# Patient Record
Sex: MALE
[De-identification: names, ages, dates, MRNs, and addresses within clinical notes are randomized per-mention and may not be internally consistent; named-entity substitution may affect disease eponyms.]

---

## 2018-09-14 ENCOUNTER — HOSPITAL ENCOUNTER (EMERGENCY)
Dept: HOSPITAL 25 - UCEAST | Age: 18
Discharge: HOME | End: 2018-09-14
Payer: COMMERCIAL

## 2018-09-14 DIAGNOSIS — Z72.51: ICD-10-CM

## 2018-09-14 DIAGNOSIS — R30.0: Primary | ICD-10-CM

## 2018-09-14 DIAGNOSIS — Z86.19: ICD-10-CM

## 2018-09-14 PROCEDURE — 86703 HIV-1/HIV-2 1 RESULT ANTBDY: CPT

## 2018-09-14 PROCEDURE — 96372 THER/PROPH/DIAG INJ SC/IM: CPT

## 2018-09-14 PROCEDURE — 81003 URINALYSIS AUTO W/O SCOPE: CPT

## 2018-09-14 PROCEDURE — 99202 OFFICE O/P NEW SF 15 MIN: CPT

## 2018-09-14 PROCEDURE — G0463 HOSPITAL OUTPT CLINIC VISIT: HCPCS

## 2018-09-14 PROCEDURE — 87491 CHLMYD TRACH DNA AMP PROBE: CPT

## 2018-09-14 PROCEDURE — 87591 N.GONORRHOEAE DNA AMP PROB: CPT

## 2018-09-14 PROCEDURE — 36415 COLL VENOUS BLD VENIPUNCTURE: CPT

## 2018-09-14 NOTE — UC
Complaint Male HPI





- HPI Summary


HPI Summary: 








19 yo male presents with urinary symptoms. He tells me that he is experiencing 

itching and "discomfort" at the end of his urethra over the last 4 days. 

Discomfort is most noticeable during urination. Has also been feeling more 

tired recently. He had unprotected sex about 2 weeks ago with a new partner - 

he has contacted her and she says she has no symptoms. Pt has had chlamydia in 

the past and says this feels similar. Denies fever, chills, hematuria, penile 

discharge, or testicular/scrotum tenderness.








- History of Current Complaint


Chief Complaint: UCGU


Stated Complaint: PERSONAL


Time Seen by Provider: 09/14/18 14:27


Hx Obtained From: Patient


Severity Initially: Moderate


Severity Currently: Moderate


Pain Intensity: 5


Pain Scale Used: 0-10 Numeric





- Allergies/Home Medications


Allergies/Adverse Reactions: 


 Allergies











Allergy/AdvReac Type Severity Reaction Status Date / Time


 


No Known Allergies Allergy   Verified 09/14/18 14:09











Home Medications: 


 Home Medications





NK [No Home Medications Reported]  09/14/18 [History Confirmed 09/14/18]











PMH/Surg Hx/FS Hx/Imm Hx





- Additional Past Medical History


Additional PMH: 





None





- Surgical History


Surgical History: Yes


Surgery Procedure, Year, and Place: wisdom tooth removal, adenoidectomy





- Family History


Known Family History: Positive: None





- Social History


Occupation: Student


Lives: Dormitory/Roommates


Alcohol Use: Occasionally


Substance Use Type: None


Smoking Status (MU): Never Smoked Tobacco





Review of Systems


Constitutional: Negative


Skin: Negative


Respiratory: Negative


Cardiovascular: Negative


Gastrointestinal: Negative


Genitourinary: Vaginal/Penile Itching


Neurovascular: Negative


Neurological: Negative


Psychological: Negative


All Other Systems Reviewed And Are Negative: Yes





Physical Exam





- Summary


Physical Exam Summary: 





GENERAL: NAD. WDWN. No pain distress.


SKIN: No rashes, sores, lesions, or open wounds.


NECK: Supple. Nontender. No lymphadenopathy. 


CHEST:  CTAB. No r/r/w. No accessory muscle use. Breathing comfortably and in 

no distress.


CV:  RRR. Without m/r/g. Pulses intact. Cap refill <2seconds


ABDOMEN: Soft. NTTP. No distention or guarding. No CVA tenderness. Bowel sounds 

present


NEURO: Alert. 


PSYCH: Age appropriate behavior.


Triage Information Reviewed: Yes


Vital Signs: 


 Initial Vital Signs











Temp  98.3 F   09/14/18 14:04


 


Pulse  59   09/14/18 14:04


 


Resp  18   09/14/18 14:04


 


BP  123/87   09/14/18 14:04


 


Pulse Ox  100   09/14/18 14:04








 Laboratory Tests











  09/14/18





  14:34


 


POC Urine Color  Yellow


 


POC Urine Clarity  Clear


 


POC Urine pH  7.5


 


POC Ur Specif Gravity  1.015


 


POC Urine Protein  Negative


 


POC Ur Glucose (UA)  Negative


 


POC Urine Ketones  Negative


 


POC Urine Blood  Negative


 


POC Urine Nitrite  Negative


 


POC Urine Bilirubin  Negative


 


POC Urine Urobilinogen  0.2


 


POC U Leukocyte Esteras  Negative











Vital Signs Reviewed: Yes


Male Genital Exam: Positive: Normal Genitalia, No Hernia, Other - No LAD.  

Negative: Epididymal Tenderness, Inguinal Tenderness, Scrotum Tenderness (R), 

Scrotum Tenderness (L), Testicular Tenderness (R), Testicular Tenderness (L), 

Urethral Discharge





 Complaint Male Course/Dx





- Course


Course Of Treatment: Options were discussed with the pt that his symptoms could 

be related to a GC/C infection. We discussed treatment now with testing or 

holding treatment and testing first. Pt elected to have treatment today and 

send urine for testing. He is also asking for HIV testing, but declines further 

labwork/STD testing. He was treated for GC/C with 250mg ceftriaxone and 1gm of 

azithromycin in the clinical course. Urine was sent.





- Differential Dx/Diagnosis


Provider Diagnoses: Dysuria.  Unprotected intercourse





Discharge





- Sign-Out/Discharge


Documenting (check all that apply): Patient Departure


All imaging exams completed and their final reports reviewed: No Studies





- Discharge Plan


Condition: Stable


Disposition: HOME


Patient Education Materials:  Safe Sex (ED)


Referrals: 


No Primary Care Phys,NOPCP [Primary Care Provider] - 


Additional Instructions: 


If you develop a fever, shortness of breath, chest pain, new or worsening 

symptoms - please call your PCP or go to the ED.


 








- Billing Disposition and Condition


Condition: STABLE


Disposition: Home

## 2018-09-17 NOTE — UC
- Progress Note


Progress Note: 





neg gc, neg ch


neg HIV


no change


ljj 9/17/2018





Discharge





- Sign-Out/Discharge


Documenting (check all that apply): Post-Discharge Follow Up


All imaging exams completed and their final reports reviewed: No Studies





- Discharge Plan


Condition: Stable


Disposition: HOME


Patient Education Materials:  Safe Sex (ED)


Referrals: 


No Primary Care Phys,NOPCP [Primary Care Provider] - 


Additional Instructions: 


If you develop a fever, shortness of breath, chest pain, new or worsening 

symptoms - please call your PCP or go to the ED.


 








- Billing Disposition and Condition


Condition: STABLE


Disposition: Home

## 2018-12-07 ENCOUNTER — HOSPITAL ENCOUNTER (EMERGENCY)
Dept: HOSPITAL 25 - UCEAST | Age: 18
Discharge: HOME | End: 2018-12-07
Payer: COMMERCIAL

## 2018-12-07 DIAGNOSIS — L98.9: Primary | ICD-10-CM

## 2018-12-07 DIAGNOSIS — Z72.51: ICD-10-CM

## 2018-12-07 PROCEDURE — G0463 HOSPITAL OUTPT CLINIC VISIT: HCPCS

## 2018-12-07 PROCEDURE — 99211 OFF/OP EST MAY X REQ PHY/QHP: CPT

## 2018-12-07 NOTE — UC
Skin Complaint HPI





- HPI Summary


HPI Summary: 





19 yo male presents with rash on groin. He tells me that on 12/5 he had 

unprotected intercourse with a new female partner. On the morning f 12/6 he 

woke with red spots in his groin and on his penis that were mildly itchy. Also 

thinks he noticed an enlarged lymph node in his groin. Today the spots are 

mostly healed and no itching. He was able to contact his partner and partner 

denied any symptoms or hx of STD. Pt denies fever, dysuria, hematuria, or 

drainage/pain from the rash. 





- History of Current Complaint


Chief Complaint: UCSkin


Time Seen by Provider: 12/07/18 16:56


Stated Complaint: RASH


Hx Obtained From: Patient


Onset/Duration: Sudden Onset


Onset Severity: Mild


Current Severity: Mild


Pain Intensity: 2


Pain Scale Used: 0-10 Numeric





- Allergy/Home Medications


Allergies/Adverse Reactions: 


 Allergies











Allergy/AdvReac Type Severity Reaction Status Date / Time


 


No Known Allergies Allergy   Verified 12/07/18 16:52














PMH/Surg Hx/FS Hx/Imm Hx





- Additional Past Medical History


Additional PMH: 





None





- Surgical History


Surgical History: Yes


Surgery Procedure, Year, and Place: wisdom tooth removal, adenoidectomy





- Family History


Known Family History: Positive: None





- Social History


Occupation: Student


Lives: Dormitory/Roommates


Alcohol Use: Occasionally


Substance Use Type: None


Smoking Status (MU): Never Smoked Tobacco





Review of Systems


All Other Systems Reviewed And Are Negative: Yes


Constitutional: Positive: Negative


Skin: Positive: Rash


Respiratory: Positive: Negative


Cardiovascular: Positive: Negative


Gastrointestinal: Positive: Negative


Genitourinary: Positive: Negative


Neurovascular: Positive: Negative


Neurological: Positive: Negative


Psychological: Positive: Negative





Physical Exam





- Summary


Physical Exam Summary: 





GENERAL: NAD. WDWN. No pain distress.


SKIN: Genital: On penis there are three <5mm areas of linear erythema which 

appear like abrasion. On pubis there are scant erythematous pustules that are 

NTTP. No drainage, streaking, ulcerations, or ecchymosis.


NECK: Supple. Nontender. No lymphadenopathy. 


CHEST:  No accessory muscle use. Breathing comfortably and in no distress.


CV:  Pulses intact. Cap refill <2seconds


NEURO: Alert.


PSYCH: Age appropriate behavior.





Triage Information Reviewed: Yes


Vital Signs: 


 Initial Vital Signs











Temp  98.3 F   12/07/18 16:47


 


Pulse  63   12/07/18 16:47


 


Resp  16   12/07/18 16:47


 


BP  126/84   12/07/18 16:47


 


Pulse Ox  100   12/07/18 16:47











Vital Signs Reviewed: Yes


Male Genital Exam: Positive: Other - B/L 5mm lymph nodes in inguinal region, 

mobile and nttp. See skin above for description of rash.  Negative: Epididymal 

Tenderness, Inguinal Tenderness, Scrotum Tenderness (R), Scrotum Tenderness (L)

, Testicular Tenderness (R), Testicular Tenderness (L), Urethral Discharge





Course/Dx





- Course


Course Of Treatment: Suspect skin irritation from recent sexual intercourse. Pt 

says rash is better today compared to yesterday. Rash does not appear to be 

consistent with any infectious process. He declined STD testing today and 

elected to continue to monitor his symptoms and will f/u if his symptoms 

persist or worsen.





- Diagnoses


Provider Diagnosis: 


 Skin irritation, High risk sexual behavior








Discharge





- Sign-Out/Discharge


Documenting (check all that apply): Patient Departure


All imaging exams completed and their final reports reviewed: No Studies





- Discharge Plan


Condition: Stable


Disposition: HOME


Patient Education Materials:  Contact Dermatitis (DC)


Referrals: 


No Primary Care Phys,NOPCP [Primary Care Provider] - 


Additional Instructions: 


If you develop a fever, shortness of breath, chest pain, new or worsening 

symptoms - please call your PCP or go to the ED.


 


1) Monitor your symptoms and refrain from using any creams/ointments to the 

area. I suspect your symptoms will go away in a couple of days - if they do not

, please be rechecked





- Billing Disposition and Condition


Condition: STABLE


Disposition: Home

## 2018-12-13 ENCOUNTER — HOSPITAL ENCOUNTER (EMERGENCY)
Dept: HOSPITAL 25 - UCEAST | Age: 18
Discharge: HOME | End: 2018-12-13
Payer: COMMERCIAL

## 2018-12-13 DIAGNOSIS — J03.90: ICD-10-CM

## 2018-12-13 DIAGNOSIS — J32.9: Primary | ICD-10-CM

## 2018-12-13 PROCEDURE — 87651 STREP A DNA AMP PROBE: CPT

## 2018-12-13 PROCEDURE — 99212 OFFICE O/P EST SF 10 MIN: CPT

## 2018-12-13 PROCEDURE — G0463 HOSPITAL OUTPT CLINIC VISIT: HCPCS

## 2018-12-13 NOTE — UC
Throat Pain/Nasal Rodney HPI





- HPI Summary


HPI Summary: 





18-year-old male comes to clinic today with a chief complaint of one week of 

upper respiratory tract infection symptoms.  Started out with yellow and green 

sputum and rhinorrhea.  Last couple of days he started getting chills and 

sweats fevers.  His toes started hurting in the last couple of days.  Patient 

started with body aches also.  No shortness of breath.  Acetaminophen does help 

with the fevers and the body aches.





- History of Current Complaint


Chief Complaint: UCGeneralIllness


Stated Complaint: URI


Time Seen by Provider: 12/13/18 15:48


Pain Intensity: 6





- Allergies/Home Medications


Allergies/Adverse Reactions: 


 Allergies











Allergy/AdvReac Type Severity Reaction Status Date / Time


 


No Known Allergies Allergy   Verified 12/13/18 15:37














PMH/Surg Hx/FS Hx/Imm Hx


Previously Healthy: Yes





- Surgical History


Surgical History: Yes


Surgery Procedure, Year, and Place: wisdom tooth removal, adenoidectomy





- Family History


Known Family History: Positive: None





- Social History


Alcohol Use: Occasionally


Substance Use Type: None


Smoking Status (MU): Never Smoked Tobacco





Review of Systems


All Other Systems Reviewed And Are Negative: Yes


Constitutional: Positive: Fever, Chills


Skin: Positive: Negative


Eyes: Positive: Negative


ENT: Positive: Sore Throat, Nasal Discharge, Sinus Congestion


Respiratory: Positive: Negative


Cardiovascular: Positive: Negative


Gastrointestinal: Positive: Negative


Motor: Positive: Negative


Neurovascular: Positive: Negative


Musculoskeletal: Positive: Negative


Neurological: Positive: Negative


Psychological: Positive: Negative


Is Patient Immunocompromised?: No





Physical Exam


Triage Information Reviewed: Yes


Appearance: No Pain Distress, Well-Nourished, Ill-Appearing - MILD


Vital Signs: 


 Initial Vital Signs











Temp  98.7 F   12/13/18 15:31


 


Pulse  72   12/13/18 15:31


 


Resp  16   12/13/18 15:31


 


BP  140/84   12/13/18 15:31


 


Pulse Ox  99   12/13/18 15:31











Vital Signs Reviewed: Yes


Eye Exam: Normal


Eyes: Positive: Conjunctiva Clear


ENT: Positive: Pharyngeal erythema, Nasal congestion, Nasal drainage, TM dull


Neck exam: Normal


Neck: Positive: Supple


Respiratory: Positive: Lungs clear, Normal breath sounds, No respiratory 

distress


Cardiovascular: Positive: RRR


Musculoskeletal Exam: Normal


Musculoskeletal: Positive: Strength Intact, ROM Intact


Neurological Exam: Normal


Neurological: Positive: Alert, Muscle Tone Normal


Psychological Exam: Normal


Psychological: Positive: Age Appropriate Behavior


Skin Exam: Normal





Throat Pain/Nasal Course/Dx





- Course


Course Of Treatment: DISCUSSED VIRAL VERSES BACTERIAL INFECTION AND THE ROLE OF 

ANTIBIOTICS.  THE PATIENT WISHES TO BE ON ANTIBIOTIC AT THIS TIME.





- Differential Dx/Diagnosis


Provider Diagnosis: 


 Sinusitis, Tonsillitis








Discharge





- Sign-Out/Discharge


Documenting (check all that apply): Patient Departure


All imaging exams completed and their final reports reviewed: No Studies





- Discharge Plan


Condition: Stable


Disposition: HOME


Patient Education Materials:  Sinusitis (ED), Tonsillitis (ED)


Referrals: 


[facility] [Provider Group]


[facility] [place] [facility] [place]


Additional Instructions: 


FOLLOW UP WITH YOUR DOCTOR IF NOT COMPLETELY IMPROVED.


GET RECHECKED FOR ANY WORSENING OF YOUR CONDITION OR QUESTIONS OR CONCERNS.








- Billing Disposition and Condition


Condition: STABLE


Disposition: Home

## 2019-04-23 ENCOUNTER — HOSPITAL ENCOUNTER (EMERGENCY)
Dept: HOSPITAL 25 - UCEAST | Age: 19
Discharge: HOME | End: 2019-04-23
Payer: COMMERCIAL

## 2019-04-23 VITALS — SYSTOLIC BLOOD PRESSURE: 121 MMHG | DIASTOLIC BLOOD PRESSURE: 82 MMHG

## 2019-04-23 DIAGNOSIS — H92.01: ICD-10-CM

## 2019-04-23 DIAGNOSIS — R09.82: ICD-10-CM

## 2019-04-23 DIAGNOSIS — J02.9: Primary | ICD-10-CM

## 2019-04-23 PROCEDURE — 87651 STREP A DNA AMP PROBE: CPT

## 2019-04-23 PROCEDURE — G0463 HOSPITAL OUTPT CLINIC VISIT: HCPCS

## 2019-04-23 PROCEDURE — 99211 OFF/OP EST MAY X REQ PHY/QHP: CPT

## 2019-04-23 NOTE — UC
Throat Pain/Nasal Rodney HPI





- HPI Summary


HPI Summary: 





17 yo male with sore throat x 2 days


? fever


some post nasal drip





right otalgia








- History of Current Complaint


Chief Complaint: UCRespiratory


Stated Complaint: SORE THROAT


Time Seen by Provider: 04/23/19 21:44


Hx Obtained From: Patient


Onset/Duration: Gradual Onset


Severity: Moderate


Pain Intensity: 7


Pain Scale Used: 0-10 Numeric


Associated Signs & Symptoms: Positive: Nasal Discharge, Fever - ?


Related History: Prior ENT Surgery - adenoidectomy





- Epiglottits Risk Factors


Epiglottis Risk Factors: Negative





- Allergies/Home Medications


Allergies/Adverse Reactions: 


 Allergies











Allergy/AdvReac Type Severity Reaction Status Date / Time


 


No Known Allergies Allergy   Verified 04/23/19 21:50











Home Medications: 


 Home Medications





D-Methorphan/PE/Acetaminophen [Daytime Cold-Flu Relief Softgl] 1 cap PO Q8HR 

PRN 04/23/19 [History Confirmed 04/23/19]


Ibuprofen [Advil] 400 mg PO Q8HR PRN 04/23/19 [History Confirmed 04/23/19]











PMH/Surg Hx/FS Hx/Imm Hx


Previously Healthy: Yes





- Surgical History


Surgical History: Yes


Surgery Procedure, Year, and Place: wisdom tooth removal, addenoidectomy





- Family History


Known Family History: Positive: Hypertension





- Social History


Alcohol Use: Occasionally


Substance Use Type: None


Smoking Status (MU): Never Smoked Tobacco





Review of Systems


All Other Systems Reviewed And Are Negative: Yes


Constitutional: Positive: Fever - conner


Skin: Positive: Negative


Eyes: Positive: Negative


ENT: Positive: Sore Throat, Ear Ache


Respiratory: Positive: Negative


Cardiovascular: Positive: Negative


Gastrointestinal: Positive: Negative


Genitourinary: Positive: Negative


Motor: Positive: Negative


Neurovascular: Positive: Negative


Musculoskeletal: Positive: Negative


Neurological: Positive: Negative


Psychological: Positive: Negative





Physical Exam


Triage Information Reviewed: Yes


Appearance: Well-Appearing, No Pain Distress, Well-Nourished


Vital Signs: 


 Initial Vital Signs











Temp  98.6 F   04/23/19 21:46


 


Pulse  66   04/23/19 21:46


 


Resp  18   04/23/19 21:46


 


BP  121/82   04/23/19 21:46


 


Pulse Ox  99   04/23/19 21:46











Vital Signs Reviewed: Yes


Eyes: Positive: Conjunctiva Clear


ENT: Positive: Hearing grossly normal, Pharyngeal erythema, Nasal congestion, 

TMs normal, Uvula midline.  Negative: Nasal drainage, Tonsillar swelling, 

Tonsillar exudate, Trismus, Muffled voice, Dental tenderness, Sinus tenderness


Neck: Positive: Supple, Enlarged Nodes @ - tender right cervical LNs>left


Respiratory: Positive: Lungs clear, Normal breath sounds, No respiratory 

distress


Cardiovascular: Positive: RRR


Musculoskeletal: Positive: ROM Intact, No Edema


Neurological Exam: Normal


Neurological: Positive: Alert


Psychological Exam: Normal


Skin Exam: Normal





Throat Pain/Nasal Course/Dx





- Course


Course Of Treatment: 





strep (-)





- Differential Dx/Diagnosis


Provider Diagnosis: 


 Acute pharyngitis








Discharge





- Sign-Out/Discharge


Documenting (check all that apply): Patient Departure


All imaging exams completed and their final reports reviewed: No Studies





- Discharge Plan


Condition: Stable


Disposition: HOME


Patient Education Materials:  Pharyngitis (ED)


Referrals: 


No Primary Care Phys,NOPCP [Primary Care Provider] - 


Additional Instructions: 


tylenol or advil as needed for pain








your strep test was neg





recheck in 3 days if not better








- Billing Disposition and Condition


Condition: STABLE


Disposition: Home